# Patient Record
Sex: MALE | URBAN - METROPOLITAN AREA
[De-identification: names, ages, dates, MRNs, and addresses within clinical notes are randomized per-mention and may not be internally consistent; named-entity substitution may affect disease eponyms.]

---

## 2022-08-03 DIAGNOSIS — Z52.3 BONE MARROW DONOR: Primary | ICD-10-CM

## 2022-09-09 PROCEDURE — 81382 HLA II TYPING 1 LOC HR: CPT | Performed by: PEDIATRICS

## 2022-09-15 LAB
A*: NORMAL
A*LOCUS SEROLOGIC EQUIVALENT: 2
A*LOCUS: NORMAL
A*SEROLOGIC EQUIVALENT: 3
ABTEST METHOD: NORMAL
B*LOCUS SEROLOGIC EQUIVALENT: 60
B*LOCUS: NORMAL
BW-1: NORMAL
C*LOCUS SEROLOGIC EQUIVALENT: 10
C*LOCUS: NORMAL
DPA1*: NORMAL
DPA1*LOCUS: NORMAL
DPB1*: NORMAL
DPB1*LOCUS NMDP: NORMAL
DPB1*LOCUS: NORMAL
DPB1*NMDP: NORMAL
DQA1*LOCUS: NORMAL
DQB1*LOCUS SEROLOGIC EQUIVALENT: 6
DQB1*LOCUS: NORMAL
DRB1*LOCUS SEROLOGIC EQUIVALENT: 13
DRB1*LOCUS: NORMAL
DRB3*LOCUS SEROLOGIC EQUIVALENT: 52
DRB3*LOCUS: NORMAL
DRSSO TEST METHOD: NORMAL

## 2023-12-12 ENCOUNTER — APPOINTMENT (RX ONLY)
Dept: URBAN - METROPOLITAN AREA CLINIC 2 | Facility: CLINIC | Age: 38
Setting detail: DERMATOLOGY
End: 2023-12-12

## 2023-12-12 DIAGNOSIS — L82.1 OTHER SEBORRHEIC KERATOSIS: ICD-10-CM | Status: STABLE

## 2023-12-12 PROCEDURE — ? COUNSELING

## 2023-12-12 PROCEDURE — 99202 OFFICE O/P NEW SF 15 MIN: CPT

## 2023-12-12 PROCEDURE — ? OTHER

## 2023-12-12 ASSESSMENT — LOCATION DETAILED DESCRIPTION DERM: LOCATION DETAILED: LEFT INFERIOR POSTAURICULAR SKIN

## 2023-12-12 ASSESSMENT — LOCATION SIMPLE DESCRIPTION DERM: LOCATION SIMPLE: SCALP

## 2023-12-12 ASSESSMENT — LOCATION ZONE DERM: LOCATION ZONE: SCALP

## 2023-12-12 NOTE — PROCEDURE: OTHER
Other (Free Text): If wants to be treated, patient can call to schedule.
Render Risk Assessment In Note?: no
Detail Level: Simple
Note Text (......Xxx Chief Complaint.): This diagnosis correlates with the